# Patient Record
Sex: MALE | Race: WHITE
[De-identification: names, ages, dates, MRNs, and addresses within clinical notes are randomized per-mention and may not be internally consistent; named-entity substitution may affect disease eponyms.]

---

## 2018-09-13 ENCOUNTER — HOSPITAL ENCOUNTER (OUTPATIENT)
Dept: HOSPITAL 92 - CCL | Age: 78
Discharge: HOME | End: 2018-09-13
Attending: INTERNAL MEDICINE
Payer: MEDICARE

## 2018-09-13 VITALS — BODY MASS INDEX: 26.3 KG/M2

## 2018-09-13 DIAGNOSIS — Z79.01: ICD-10-CM

## 2018-09-13 DIAGNOSIS — Z79.899: ICD-10-CM

## 2018-09-13 DIAGNOSIS — Z91.030: ICD-10-CM

## 2018-09-13 DIAGNOSIS — Z86.718: ICD-10-CM

## 2018-09-13 DIAGNOSIS — Z88.8: ICD-10-CM

## 2018-09-13 DIAGNOSIS — E78.5: ICD-10-CM

## 2018-09-13 DIAGNOSIS — I10: ICD-10-CM

## 2018-09-13 DIAGNOSIS — I48.3: Primary | ICD-10-CM

## 2018-09-13 DIAGNOSIS — G47.30: ICD-10-CM

## 2018-09-13 DIAGNOSIS — E03.9: ICD-10-CM

## 2018-09-13 DIAGNOSIS — Z88.1: ICD-10-CM

## 2018-09-13 PROCEDURE — 93653 COMPRE EP EVAL TX SVT: CPT

## 2018-09-13 PROCEDURE — 4A0234Z MEASUREMENT OF CARDIAC ELECTRICAL ACTIVITY, PERCUTANEOUS APPROACH: ICD-10-PCS | Performed by: INTERNAL MEDICINE

## 2018-09-13 PROCEDURE — 76942 ECHO GUIDE FOR BIOPSY: CPT

## 2018-09-13 PROCEDURE — 93010 ELECTROCARDIOGRAM REPORT: CPT

## 2018-09-13 PROCEDURE — C1769 GUIDE WIRE: HCPCS

## 2018-09-13 PROCEDURE — C1730 CATH, EP, 19 OR FEW ELECT: HCPCS

## 2018-09-13 PROCEDURE — 02583ZZ DESTRUCTION OF CONDUCTION MECHANISM, PERCUTANEOUS APPROACH: ICD-10-PCS | Performed by: INTERNAL MEDICINE

## 2018-09-13 PROCEDURE — 93623 PRGRMD STIMJ&PACG IV RX NFS: CPT

## 2018-09-13 PROCEDURE — 93613 INTRACARDIAC EPHYS 3D MAPG: CPT

## 2018-09-13 PROCEDURE — 93005 ELECTROCARDIOGRAM TRACING: CPT

## 2018-09-13 PROCEDURE — 4A023FZ MEASUREMENT OF CARDIAC RHYTHM, PERCUTANEOUS APPROACH: ICD-10-PCS | Performed by: INTERNAL MEDICINE

## 2018-09-14 NOTE — OP
DATE OF CONSULTATION:  09/13/2018 

 

This is an EP study and radiofrequency ablation report.

 

REFERRING PHYSICIAN:  Tra Whitaker MD

 

REASON FOR PROCEDURE:  Mr. Meek is a 77-year-old man with prior history of hypertension, sleep apn
ea who has had recurrent atrial flutter with prior cardioversion, chronically anticoagulated with Serena
devante.  He is here for EP study and ablation procedure.

 

DESCRIPTION OF PROCEDURE:  The patient received deep sedation by anesthesia specialist.  After adequa
te time of sedation achieved, the right femoral venous area was prepped, draped, and anesthetized usi
ng subcutaneous lidocaine.  Under ultrasound guidance, two 8- Sinhala sheaths were introduced.  Throug
h this, a decapolar CS catheter was inserted through the right ventricle, His bundle, right atrium, a
nd eventually to the CS position.  Pacing mapping and recording was performed in each location.

 

The baseline finding is an atrial flutter with cycle length 260 milliseconds.  Post-pacing interval a
t the cavotricuspid isthmus was equal to the tachycardia cycle length.  Following that, a [employer] 
SF ST catheter was advanced to the right atrium and 3D map of the right atrium was obtained.  His bun
dle and the CS was delineated as was the cavotricuspid isthmus, which was subsequently ablated with a
 total of 2 minutes and 56 seconds of ablation at 40 triana.  This maneuver terminated atrial flutter 
and sinus rhythm ensued.  Following that, proximal CS pacing was deployed and the transisthmus time w
as found to be increased from 40 milliseconds to over 200 milliseconds with the longest transisthmus 
time measurement was by the ablation line suggestive of _____ block.  No recurrent atrial arrhythmias
 are noted.

 

At the end of the case, the cardiac silhouette did not change of significance to suggest any effusion
.

 

Measurements including baseline cycle length _____ after ablation, , QRS 95, , HV 54 mill
iseconds.  AV Wenckebach cycle length was 500 milliseconds.  No VA conduction was noted.

 

CONCLUSION:  Successful ablation of a cavotricuspid isthmus-dependent atrial flutter.

 

PLAN:  Routine followup.  Resume anticoagulation.

## 2018-09-17 NOTE — EKG
Test Reason : 

Blood Pressure : ***/*** mmHG

Vent. Rate : 064 BPM     Atrial Rate : 064 BPM

   P-R Int : 190 ms          QRS Dur : 108 ms

    QT Int : 434 ms       P-R-T Axes : 071 041 051 degrees

   QTc Int : 447 ms

 

Sinus rhythm with Premature atrial complexes

Otherwise normal ECG

When compared with ECG of 05-SEP-2018 11:24,

Sinus rhythm has replaced Atrial flutter

ST no longer elevated in Inferior leads

Confirmed by DR. VIVEK HUDSON ([id number]) on 9/17/2018 10:06:39 AM

 

Referred By:  IDALIA           Confirmed By:DR. VIVEK HUDSON